# Patient Record
Sex: FEMALE | Race: OTHER | HISPANIC OR LATINO | URBAN - METROPOLITAN AREA
[De-identification: names, ages, dates, MRNs, and addresses within clinical notes are randomized per-mention and may not be internally consistent; named-entity substitution may affect disease eponyms.]

---

## 2019-10-22 ENCOUNTER — OUTPATIENT (OUTPATIENT)
Dept: OUTPATIENT SERVICES | Facility: HOSPITAL | Age: 19
LOS: 1 days | Discharge: HOME | End: 2019-10-22

## 2019-10-23 DIAGNOSIS — N89.8 OTHER SPECIFIED NONINFLAMMATORY DISORDERS OF VAGINA: ICD-10-CM

## 2019-11-14 ENCOUNTER — INPATIENT (INPATIENT)
Facility: HOSPITAL | Age: 19
LOS: 2 days | Discharge: HOME | End: 2019-11-17
Attending: OBSTETRICS & GYNECOLOGY | Admitting: OBSTETRICS & GYNECOLOGY

## 2019-11-14 VITALS
RESPIRATION RATE: 16 BRPM | TEMPERATURE: 98 F | SYSTOLIC BLOOD PRESSURE: 130 MMHG | HEART RATE: 90 BPM | DIASTOLIC BLOOD PRESSURE: 90 MMHG

## 2019-11-14 LAB
ALBUMIN SERPL ELPH-MCNC: 3.8 G/DL — SIGNIFICANT CHANGE UP (ref 3.5–5.2)
ALP SERPL-CCNC: 176 U/L — HIGH (ref 30–115)
ALT FLD-CCNC: 8 U/L — SIGNIFICANT CHANGE UP (ref 0–41)
AMPHET UR-MCNC: NEGATIVE — SIGNIFICANT CHANGE UP
ANION GAP SERPL CALC-SCNC: 16 MMOL/L — HIGH (ref 7–14)
APPEARANCE UR: ABNORMAL
AST SERPL-CCNC: 14 U/L — SIGNIFICANT CHANGE UP (ref 0–41)
BACTERIA # UR AUTO: ABNORMAL
BARBITURATES UR SCN-MCNC: NEGATIVE — SIGNIFICANT CHANGE UP
BASOPHILS # BLD AUTO: 0.03 K/UL — SIGNIFICANT CHANGE UP (ref 0–0.2)
BASOPHILS NFR BLD AUTO: 0.3 % — SIGNIFICANT CHANGE UP (ref 0–1)
BENZODIAZ UR-MCNC: NEGATIVE — SIGNIFICANT CHANGE UP
BILIRUB SERPL-MCNC: <0.2 MG/DL — SIGNIFICANT CHANGE UP (ref 0.2–1.2)
BILIRUB UR-MCNC: NEGATIVE — SIGNIFICANT CHANGE UP
BLD GP AB SCN SERPL QL: SIGNIFICANT CHANGE UP
BUN SERPL-MCNC: 9 MG/DL — LOW (ref 10–20)
BUPRENORPHINE SCREEN, URINE RESULT: NEGATIVE — SIGNIFICANT CHANGE UP
CALCIUM SERPL-MCNC: 9.3 MG/DL — SIGNIFICANT CHANGE UP (ref 8.5–10.1)
CHLORIDE SERPL-SCNC: 100 MMOL/L — SIGNIFICANT CHANGE UP (ref 98–110)
CO2 SERPL-SCNC: 20 MMOL/L — SIGNIFICANT CHANGE UP (ref 17–32)
COCAINE METAB.OTHER UR-MCNC: NEGATIVE — SIGNIFICANT CHANGE UP
COLOR SPEC: SIGNIFICANT CHANGE UP
CREAT ?TM UR-MCNC: 66 MG/DL — SIGNIFICANT CHANGE UP
CREAT SERPL-MCNC: 0.7 MG/DL — SIGNIFICANT CHANGE UP (ref 0.7–1.5)
DIFF PNL FLD: ABNORMAL
EOSINOPHIL # BLD AUTO: 0.05 K/UL — SIGNIFICANT CHANGE UP (ref 0–0.7)
EOSINOPHIL NFR BLD AUTO: 0.4 % — SIGNIFICANT CHANGE UP (ref 0–8)
EPI CELLS # UR: 10 /HPF — HIGH (ref 0–5)
FENTANYL UR QL: NEGATIVE — SIGNIFICANT CHANGE UP
GLUCOSE SERPL-MCNC: 104 MG/DL — HIGH (ref 70–99)
GLUCOSE UR QL: NEGATIVE — SIGNIFICANT CHANGE UP
HCT VFR BLD CALC: 31.7 % — LOW (ref 37–47)
HGB BLD-MCNC: 10.9 G/DL — LOW (ref 12–16)
HYALINE CASTS # UR AUTO: 21 /LPF — HIGH (ref 0–7)
IMM GRANULOCYTES NFR BLD AUTO: 0.9 % — HIGH (ref 0.1–0.3)
KETONES UR-MCNC: NEGATIVE — SIGNIFICANT CHANGE UP
L&D DRUG SCREEN, URINE: SIGNIFICANT CHANGE UP
LEUKOCYTE ESTERASE UR-ACNC: ABNORMAL
LYMPHOCYTES # BLD AUTO: 1.64 K/UL — SIGNIFICANT CHANGE UP (ref 1.2–3.4)
LYMPHOCYTES # BLD AUTO: 14.1 % — LOW (ref 20.5–51.1)
MCHC RBC-ENTMCNC: 29.2 PG — SIGNIFICANT CHANGE UP (ref 27–31)
MCHC RBC-ENTMCNC: 34.4 G/DL — SIGNIFICANT CHANGE UP (ref 32–37)
MCV RBC AUTO: 85 FL — SIGNIFICANT CHANGE UP (ref 81–99)
METHADONE UR-MCNC: NEGATIVE — SIGNIFICANT CHANGE UP
MONOCYTES # BLD AUTO: 0.9 K/UL — HIGH (ref 0.1–0.6)
MONOCYTES NFR BLD AUTO: 7.7 % — SIGNIFICANT CHANGE UP (ref 1.7–9.3)
NEUTROPHILS # BLD AUTO: 8.92 K/UL — HIGH (ref 1.4–6.5)
NEUTROPHILS NFR BLD AUTO: 76.6 % — HIGH (ref 42.2–75.2)
NITRITE UR-MCNC: NEGATIVE — SIGNIFICANT CHANGE UP
NRBC # BLD: 0 /100 WBCS — SIGNIFICANT CHANGE UP (ref 0–0)
OPIATES UR-MCNC: NEGATIVE — SIGNIFICANT CHANGE UP
OXYCODONE UR-MCNC: NEGATIVE — SIGNIFICANT CHANGE UP
PCP UR-MCNC: NEGATIVE — SIGNIFICANT CHANGE UP
PH UR: 7 — SIGNIFICANT CHANGE UP (ref 5–8)
PLATELET # BLD AUTO: 195 K/UL — SIGNIFICANT CHANGE UP (ref 130–400)
POTASSIUM SERPL-MCNC: 4 MMOL/L — SIGNIFICANT CHANGE UP (ref 3.5–5)
POTASSIUM SERPL-SCNC: 4 MMOL/L — SIGNIFICANT CHANGE UP (ref 3.5–5)
PRENATAL SYPHILIS TEST: SIGNIFICANT CHANGE UP
PROPOXYPHENE QUALITATIVE URINE RESULT: NEGATIVE — SIGNIFICANT CHANGE UP
PROT ?TM UR-MCNC: 20 MG/DLG/24H — SIGNIFICANT CHANGE UP
PROT SERPL-MCNC: 6.8 G/DL — SIGNIFICANT CHANGE UP (ref 6–8)
PROT UR-MCNC: NEGATIVE — SIGNIFICANT CHANGE UP
PROT/CREAT UR-RTO: 0.3 RATIO — HIGH (ref 0–0.2)
RBC # BLD: 3.73 M/UL — LOW (ref 4.2–5.4)
RBC # FLD: 13.3 % — SIGNIFICANT CHANGE UP (ref 11.5–14.5)
RBC CASTS # UR COMP ASSIST: 1 /HPF — SIGNIFICANT CHANGE UP (ref 0–4)
SODIUM SERPL-SCNC: 136 MMOL/L — SIGNIFICANT CHANGE UP (ref 135–146)
SP GR SPEC: 1.01 — SIGNIFICANT CHANGE UP (ref 1.01–1.02)
URATE SERPL-MCNC: 5.2 MG/DL — SIGNIFICANT CHANGE UP (ref 2.5–7)
UROBILINOGEN FLD QL: SIGNIFICANT CHANGE UP
WBC # BLD: 11.64 K/UL — HIGH (ref 4.8–10.8)
WBC # FLD AUTO: 11.64 K/UL — HIGH (ref 4.8–10.8)
WBC UR QL: 92 /HPF — HIGH (ref 0–5)

## 2019-11-14 RX ORDER — OXYTOCIN 10 UNIT/ML
333.33 VIAL (ML) INJECTION
Qty: 20 | Refills: 0 | Status: COMPLETED | OUTPATIENT
Start: 2019-11-14 | End: 2019-11-14

## 2019-11-14 RX ORDER — DEXAMETHASONE 0.5 MG/5ML
4 ELIXIR ORAL EVERY 6 HOURS
Refills: 0 | Status: DISCONTINUED | OUTPATIENT
Start: 2019-11-14 | End: 2019-11-17

## 2019-11-14 RX ORDER — FENTANYL/BUPIVACAINE/NS/PF 2MCG/ML-.1
250 PLASTIC BAG, INJECTION (ML) INJECTION
Refills: 0 | Status: DISCONTINUED | OUTPATIENT
Start: 2019-11-14 | End: 2019-11-17

## 2019-11-14 RX ORDER — ONDANSETRON 8 MG/1
4 TABLET, FILM COATED ORAL EVERY 6 HOURS
Refills: 0 | Status: DISCONTINUED | OUTPATIENT
Start: 2019-11-14 | End: 2019-11-17

## 2019-11-14 RX ORDER — NALOXONE HYDROCHLORIDE 4 MG/.1ML
0.1 SPRAY NASAL
Refills: 0 | Status: DISCONTINUED | OUTPATIENT
Start: 2019-11-14 | End: 2019-11-17

## 2019-11-14 RX ORDER — OXYTOCIN 10 UNIT/ML
2 VIAL (ML) INJECTION
Qty: 30 | Refills: 0 | Status: DISCONTINUED | OUTPATIENT
Start: 2019-11-14 | End: 2019-11-17

## 2019-11-14 RX ORDER — SODIUM CHLORIDE 9 MG/ML
1000 INJECTION, SOLUTION INTRAVENOUS
Refills: 0 | Status: DISCONTINUED | OUTPATIENT
Start: 2019-11-14 | End: 2019-11-15

## 2019-11-14 RX ADMIN — Medication 2 MILLIUNIT(S)/MIN: at 13:11

## 2019-11-14 NOTE — PROGRESS NOTE ADULT - ASSESSMENT
A/P: 20yo  at 39w, GBS neg, preeclampsia without severe features, BPs now well controlled, s/p epidural, currently on pitocin  -continue pitocin  -pain management prn  -cont efm/toco  -cont to monitor vitals  -cont iv hydration  -f/u pending labs       Dr. Kim aware and Dr. Gibbons to be made aware

## 2019-11-14 NOTE — OB PROVIDER H&P - NSHPLABSRESULTS_GEN_ALL_CORE
NIPT low risk        Sono @ 29.6wks S=D, EFW: 1597g, 3'8" (59%), vtx, ant placenta, BPP 8/8  Sono @ 18.4wks S=D, Nl anatomy  Sono @ 12.6wks S=D, NT WNL    Seq 1/ 2 negative

## 2019-11-14 NOTE — OB PROVIDER H&P - ASSESSMENT
19y.o.  @ 39wks in early labor, elevated BP r/o gHTN vs preesclampsia  Admit to L&D  IVF, labs  Continuous efm and toco  Pain management PRN  Anticipate   Dr. Gibbons Aware

## 2019-11-14 NOTE — OB PROVIDER H&P - HISTORY OF PRESENT ILLNESS
19y.o.  @ 39wks by LMP presents c/o ctx since last night which became more intense at 0400am. Pt denies LOF, VB and states good FM.  Pt found to have elevated BP in triage. Pt states she had HA last night, left frontal which resolved spontaneously. Pt denies visual changes or epigastric pain.

## 2019-11-14 NOTE — PROGRESS NOTE ADULT - SUBJECTIVE AND OBJECTIVE BOX
PGY1 Note    Patient seen at bedside for evaluation of labor progression, doing well, no complaints.     T(F): 98.2 (19 @ 08:37), Max: 98.2 (19 @ 08:01)  HR: 68 (19 @ 18:48) (67 - 114)  BP: 92/59 (19 @ 18:47) (92/59 - 145/98)  RR: 16 (19 @ 08:37) (16 - 16)  SpO2: 99% (19 @ 18:48) (94% - 100%)      EFM: 130/mod/+accels; cat 1  TOCO: q2-3 mins  SVE: deferred, 3.5/80/-2 @1200    Medications:  (Floorstock): 1 Each (19 @ 12:39)  (Floorstock): 1 Each (19 @ 12:39)  (Floorstock): 1 Each (19 @ 17:50)  oxytocin Infusion: 2 mL/Hr (19 @ 12:11)      Labs:                        10.9   11.64 )-----------( 195      ( 2019 08:32 )             31.7         136  |  100  |  9<L>  ----------------------------<  104<H>  4.0   |  20  |  0.7    Ca    9.3      2019 08:32    TPro  6.8  /  Alb  3.8  /  TBili  <0.2  /  DBili  x   /  AST  14  /  ALT  8   /  AlkPhos  176<H>      ABO RH Interpretation: B POS (19 @ 09:27)    Antibody Screen: NEG (19 @ 09:27)    Urinalysis Basic - ( 2019 08:32 )    Color: Light Yellow / Appearance: Slightly Turbid / S.012 / pH: x  Gluc: x / Ketone: Negative  / Bili: Negative / Urobili: <2 mg/dL   Blood: x / Protein: Negative / Nitrite: Negative   Leuk Esterase: Large / RBC: 1 /HPF / WBC 92 /HPF   Sq Epi: x / Non Sq Epi: 10 /HPF / Bacteria: Moderate      L&amp;D Drug Screen, Urine: Done (19 @ 08:32)    Prenatal Syphilis Test: Nonreact (19 @ 08:32)    Uric Acid, Serum: 5.2 mg/dL (19 @ 08:32)      Protein/Creatinine Ratio Calculation: 0.3 Ratio (19 @ 08:32)

## 2019-11-14 NOTE — PROGRESS NOTE ADULT - SUBJECTIVE AND OBJECTIVE BOX
PGY 1 Note    Patient seen at bedside for evaluation of labor progression.  Reports some contractions but does not desire pain management at this time.  Denies headache, changes in vision, chest pain, SOB, RUQ/epigastric pain, N/V, fevers, chills, diarrhea, LE pain/swelling.    Vital Signs Last 24 Hrs  T(C): 36.8 (2019 08:37), Max: 36.8 (2019 08:01)  T(F): 98.2 (2019 08:37), Max: 98.2 (2019 08:01)  HR: 86 (2019 12:07) (67 - 110)  BP: 134/86 (2019 12:07) (120/97 - 145/89) first elevated 130/90 @0804  RR: 16 (2019 08:37) (16 - 16)    EFM: 120/mod/+accel, Cat I  TOCO: q6-8m  SVE: 3.5/80/-2 @1200 per Dr. Gibbons    Labs:                        10.9   11.64 )-----------( 195      ( 2019 08:32 )             31.7         136  |  100  |  9<L>  ----------------------------<  104<H>  4.0   |  20  |  0.7    Ca    9.3      2019 08:32    TPro  6.8  /  Alb  3.8  /  TBili  <0.2  /  DBili  x   /  AST  14  /  ALT  8   /  AlkPhos  176<H>      ABO RH Interpretation: B POS (19 @ 09:27)  antibody neg  Uric acid 5.2    Urinalysis Basic - ( 2019 08:32 )    Color: Light Yellow / Appearance: Slightly Turbid / S.012 / pH: x  Gluc: x / Ketone: Negative  / Bili: Negative / Urobili: <2 mg/dL   Blood: x / Protein: Negative / Nitrite: Negative   Leuk Esterase: Large / RBC: 1 /HPF / WBC 92 /HPF   Sq Epi: x / Non Sq Epi: 10 /HPF / Bacteria: Moderate          Meds: lactated ringers. 1000 milliLiter(s) IV Continuous <Continuous>  oxytocin Infusion 333.333 milliUNIT(s)/Min IV Continuous <Continuous>  oxytocin Infusion 2 milliUNIT(s)/Min IV Continuous <Continuous>

## 2019-11-14 NOTE — PROGRESS NOTE ADULT - ASSESSMENT
A/P:  20yo  @39w, GBS neg, r/o gHTN vs. preeclampsia, BPs now well controlled, in early labor  -cont EFM/toco  -clear liquid diet, IVF  -pain management prn  -f/u UDS, urine protein creatinine  -BPs q15m  -start pitocin at 1300 for labor augmentation    Dr. Erickson and Dr. Gibbons aware.

## 2019-11-14 NOTE — OB RN PATIENT PROFILE - PRO ANTIBODY SCREEN
Electrical Cardioversion: What to Expect at Home  Your Recovery    Electrical cardioversion is a treatment for an abnormal heartbeat, such as atrial fibrillation, supraventricular tachycardia, or ventricular tachycardia (VT). It uses a brief electrical shock to reset your heart's rhythm. After cardioversion, you may have redness, like a sunburn, where the patches were. The medicines you got to make you sleepy may make you feel drowsy for the rest of the day. Your doctor may have you take medicines to help the heart beat normally and to prevent blood clots. This care sheet gives you a general idea about how long it will take for you to recover. But each person recovers at a different pace. Follow the steps below to feel better as quickly as possible. How can you care for yourself at home? Medicines  · Be safe with medicines. Take your medicines exactly as prescribed. Call your doctor if you think you are having a problem with your medicine. You may take one or more of the following medicines:  ¨ Rate-control medicines to slow the heart rate. These include beta-blockers, calcium channel blockers, and digoxin. ¨ Rhythm control medicines that help the heart keep a normal rhythm. ¨ Blood thinners, also called anticoagulants, which help prevent blood clots. You will get more details on the specific medicines your doctor prescribes. Be sure you know how to take your medicines safely. · Do not take any vitamins, over-the-counter medicines, or herbal products without talking to your doctor first.  Exercise  · Start light exercise if your doctor says that it is okay. Even a small amount will help you get stronger, have more energy, and manage your stress. Walking is an easy way to get exercise. Start out by walking a little more than you did in the hospital. Bit by bit, increase the amount you walk. · When you exercise, watch for signs that your heart is working too hard.  You are pushing too hard if you cannot talk while you are exercising. If you become short of breath or dizzy or have chest pain, sit down and rest right away. · Check your pulse regularly. Place two fingers on the artery at the palm side of your wrist in line with your thumb. If your heartbeat seems uneven or fast, talk to your doctor. Other instructions  · Ask your doctor when you can drive again. · Do not smoke. If you need help quitting, talk to your doctor about stop-smoking programs and medicines. These can increase your chances of quitting for good. · Limit alcohol. Follow-up care is a key part of your treatment and safety. Be sure to make and go to all appointments, and call your doctor if you are having problems. It's also a good idea to know your test results and keep a list of the medicines you take. When should you call for help? Call 911 anytime you think you may need emergency care. For example, call if:  · You have chest pain or pressure. This may occur with:  ¨ Sweating. ¨ Shortness of breath. ¨ Nausea or vomiting. ¨ Pain that spreads from the chest to the neck, jaw, or one or both shoulders or arms. ¨ A fast or uneven pulse. After calling 911, the  may tell you to chew 1 adult-strength or 2 to 4 low-dose aspirin. Wait for an ambulance. Do not try to drive yourself. · You have symptoms of a stroke. These may include:  ¨ Sudden numbness, tingling, weakness, or loss of movement in your face, arm, or leg, especially on only one side of your body. ¨ Sudden vision changes. ¨ Sudden trouble speaking. ¨ Sudden confusion or trouble understanding simple statements. ¨ Sudden problems with walking or balance. ¨ A sudden, severe headache that is different from past headaches. · You vomit blood or what looks like coffee grounds. · You pass maroon or very bloody stools. · You passed out (lost consciousness).   Call your doctor now or seek immediate medical care if:  · You feel dizzy or lightheaded, or you feel like you may faint.  · Your heart rate becomes irregular. · You have shortness of breath. · You have any unusual bleeding, such as:  ¨ Bruises or blood spots under the skin. ¨ A nosebleed that you cannot stop. ¨ Bleeding gums when you brush your teeth. ¨ Blood in your urine. ¨ Vaginal bleeding when you are not having your period, or heavy period bleeding. ¨ Your stools are black and tarlike or have streaks of blood. Watch closely for any changes in your health, and be sure to contact your doctor if:  · You do not get better as expected. Where can you learn more? Go to http://buck-anjelica.info/. Enter A617 in the search box to learn more about \"Electrical Cardioversion: What to Expect at Home. \"  Current as of: January 27, 2016  Content Version: 11.1  © 7065-8067 [employer]. Care instructions adapted under license by [employer] (which disclaims liability or warranty for this information). If you have questions about a medical condition or this instruction, always ask your healthcare professional. [place] [address] any warranty or liability for your use of this information. negative

## 2019-11-14 NOTE — PROGRESS NOTE ADULT - SUBJECTIVE AND OBJECTIVE BOX
PGY1 Note    Patient seen at bedside. No complaints at the moment.    T(F): 98.2 ( @ 08:37), Max: 98.2 ( @ 08:01)  HR: 86 ( @ 21:43)  BP: 111/72 ( @ 21:33) (92/59 - 145/98)  RR: 16 ( @ 08:37)    EFM: 130bpm/moderate variability/+accels  TOCO: q2-3mins   SVE: 7/90/-2, vtx, AROM clear     Medications:  oxytocin Infusion: 2 ( @ 12:11)      Labs:                        10.9   11.64 )-----------( 195      ( 2019 08:32 )             31.7         136  |  100  |  9<L>  ----------------------------<  104<H>  4.0   |  20  |  0.7    Ca    9.3      2019 08:32    TPro  6.8  /  Alb  3.8  /  TBili  <0.2  /  DBili  x   /  AST  14  /  ALT  8   /  AlkPhos  176<H>      Prenatal Syphilis Test: Nonreact ( @ 08:32)  Uric Acid, Serum: 5.2 mg/dL ( @ 08:32)  Antibody Screen: NEG (19 @ 09:27)    Urinalysis Basic - ( 2019 08:32 )    Color: Light Yellow / Appearance: Slightly Turbid / S.012 / pH: x  Gluc: x / Ketone: Negative  / Bili: Negative / Urobili: <2 mg/dL   Blood: x / Protein: Negative / Nitrite: Negative   Leuk Esterase: Large / RBC: 1 /HPF / WBC 92 /HPF   Sq Epi: x / Non Sq Epi: 10 /HPF / Bacteria: Moderate

## 2019-11-14 NOTE — OB PROVIDER H&P - NSHPPHYSICALEXAM_GEN_ALL_CORE
T(C): 36.8 (11-14-19 @ 08:01), Max: 36.8 (11-14-19 @ 08:01)  HR: 110 (11-14-19 @ 08:20) (90 - 110)  BP: 128/93 (11-14-19 @ 08:20) (128/93 - 130/90)  RR: 16 (11-14-19 @ 08:01) (16 - 16)    Abd: gravid, soft, NT, neg epigastric/ RUQ tenderness  VE: 3-4/80/-2  Ctx: q 2-5 min  FHR: 140 moderate variability, Cat I

## 2019-11-14 NOTE — OB PROVIDER H&P - ATTENDING COMMENTS
IMP: IUP @ 39wks, Early Labor, elevated BP    Plan: Admit, Labs, Possible pitocin augmentation, pain management, anticipated

## 2019-11-14 NOTE — PROGRESS NOTE ADULT - ASSESSMENT
A/P: 18yo  @39w, GBS neg, preeclampsia without severe features, BPs now well controlled, s/p epidural, currently on pitocin  -continue pitocin  -pain management prn  -cont efm/toco  -cont to monitor vitals  -cont iv hydration    Will make Dr. Erickson and Dr. Gibbons aware.

## 2019-11-15 RX ORDER — GLYCERIN ADULT
1 SUPPOSITORY, RECTAL RECTAL AT BEDTIME
Refills: 0 | Status: DISCONTINUED | OUTPATIENT
Start: 2019-11-15 | End: 2019-11-17

## 2019-11-15 RX ORDER — LANOLIN
1 OINTMENT (GRAM) TOPICAL EVERY 6 HOURS
Refills: 0 | Status: DISCONTINUED | OUTPATIENT
Start: 2019-11-15 | End: 2019-11-17

## 2019-11-15 RX ORDER — IBUPROFEN 200 MG
600 TABLET ORAL EVERY 6 HOURS
Refills: 0 | Status: COMPLETED | OUTPATIENT
Start: 2019-11-15 | End: 2020-10-13

## 2019-11-15 RX ORDER — SIMETHICONE 80 MG/1
80 TABLET, CHEWABLE ORAL EVERY 4 HOURS
Refills: 0 | Status: DISCONTINUED | OUTPATIENT
Start: 2019-11-15 | End: 2019-11-17

## 2019-11-15 RX ORDER — MAGNESIUM HYDROXIDE 400 MG/1
30 TABLET, CHEWABLE ORAL
Refills: 0 | Status: DISCONTINUED | OUTPATIENT
Start: 2019-11-15 | End: 2019-11-17

## 2019-11-15 RX ORDER — HYDROCORTISONE 1 %
1 OINTMENT (GRAM) TOPICAL EVERY 6 HOURS
Refills: 0 | Status: DISCONTINUED | OUTPATIENT
Start: 2019-11-15 | End: 2019-11-17

## 2019-11-15 RX ORDER — DIPHENHYDRAMINE HCL 50 MG
25 CAPSULE ORAL EVERY 6 HOURS
Refills: 0 | Status: DISCONTINUED | OUTPATIENT
Start: 2019-11-15 | End: 2019-11-17

## 2019-11-15 RX ORDER — BENZOCAINE 10 %
1 GEL (GRAM) MUCOUS MEMBRANE EVERY 6 HOURS
Refills: 0 | Status: DISCONTINUED | OUTPATIENT
Start: 2019-11-15 | End: 2019-11-17

## 2019-11-15 RX ORDER — DIBUCAINE 1 %
1 OINTMENT (GRAM) RECTAL EVERY 6 HOURS
Refills: 0 | Status: DISCONTINUED | OUTPATIENT
Start: 2019-11-15 | End: 2019-11-17

## 2019-11-15 RX ORDER — KETOROLAC TROMETHAMINE 30 MG/ML
30 SYRINGE (ML) INJECTION ONCE
Refills: 0 | Status: DISCONTINUED | OUTPATIENT
Start: 2019-11-15 | End: 2019-11-15

## 2019-11-15 RX ORDER — OXYCODONE HYDROCHLORIDE 5 MG/1
5 TABLET ORAL
Refills: 0 | Status: DISCONTINUED | OUTPATIENT
Start: 2019-11-15 | End: 2019-11-17

## 2019-11-15 RX ORDER — INFLUENZA VIRUS VACCINE 15; 15; 15; 15 UG/.5ML; UG/.5ML; UG/.5ML; UG/.5ML
0.5 SUSPENSION INTRAMUSCULAR ONCE
Refills: 0 | Status: COMPLETED | OUTPATIENT
Start: 2019-11-15 | End: 2019-11-16

## 2019-11-15 RX ORDER — AER TRAVELER 0.5 G/1
1 SOLUTION RECTAL; TOPICAL EVERY 4 HOURS
Refills: 0 | Status: DISCONTINUED | OUTPATIENT
Start: 2019-11-15 | End: 2019-11-17

## 2019-11-15 RX ORDER — OXYCODONE HYDROCHLORIDE 5 MG/1
5 TABLET ORAL ONCE
Refills: 0 | Status: DISCONTINUED | OUTPATIENT
Start: 2019-11-15 | End: 2019-11-17

## 2019-11-15 RX ORDER — ACETAMINOPHEN 500 MG
975 TABLET ORAL
Refills: 0 | Status: DISCONTINUED | OUTPATIENT
Start: 2019-11-15 | End: 2019-11-17

## 2019-11-15 RX ORDER — PRAMOXINE HYDROCHLORIDE 150 MG/15G
1 AEROSOL, FOAM RECTAL EVERY 4 HOURS
Refills: 0 | Status: DISCONTINUED | OUTPATIENT
Start: 2019-11-15 | End: 2019-11-17

## 2019-11-15 RX ORDER — IBUPROFEN 200 MG
600 TABLET ORAL EVERY 6 HOURS
Refills: 0 | Status: DISCONTINUED | OUTPATIENT
Start: 2019-11-15 | End: 2019-11-17

## 2019-11-15 RX ORDER — SODIUM CHLORIDE 9 MG/ML
3 INJECTION INTRAMUSCULAR; INTRAVENOUS; SUBCUTANEOUS EVERY 8 HOURS
Refills: 0 | Status: DISCONTINUED | OUTPATIENT
Start: 2019-11-15 | End: 2019-11-17

## 2019-11-15 RX ADMIN — Medication 30 MILLIGRAM(S): at 05:10

## 2019-11-15 RX ADMIN — Medication 975 MILLIGRAM(S): at 08:31

## 2019-11-15 RX ADMIN — Medication 975 MILLIGRAM(S): at 14:57

## 2019-11-15 RX ADMIN — SODIUM CHLORIDE 3 MILLILITER(S): 9 INJECTION INTRAMUSCULAR; INTRAVENOUS; SUBCUTANEOUS at 20:40

## 2019-11-15 RX ADMIN — Medication 600 MILLIGRAM(S): at 17:16

## 2019-11-15 RX ADMIN — SODIUM CHLORIDE 3 MILLILITER(S): 9 INJECTION INTRAMUSCULAR; INTRAVENOUS; SUBCUTANEOUS at 14:56

## 2019-11-15 RX ADMIN — Medication 600 MILLIGRAM(S): at 23:09

## 2019-11-15 RX ADMIN — Medication 600 MILLIGRAM(S): at 11:56

## 2019-11-15 RX ADMIN — Medication 975 MILLIGRAM(S): at 20:40

## 2019-11-15 NOTE — OB PROVIDER DELIVERY SUMMARY - NSPROVIDERDELIVERYNOTE_OBGYN_ALL_OB_FT
Pt became fully dilated and pushed well over intact perineum, delivery of head in MELINDA position, nose and mouth bulb suctioned on perineum, followed by delivery of shoulders and body without difficulty, live male infant, APGARs 9/9, 3-vessel cord + placenta complete, 2nd degree perineal and b/l periurethral lacerations repaired with chromic, no complications

## 2019-11-15 NOTE — PROGRESS NOTE ADULT - SUBJECTIVE AND OBJECTIVE BOX
PGY1 Note    Patient seen at bedside. Pt complaining of increased consistent vaginal and rectal pressure.     T(F): 99.14 (11-15 @ 00:11), Max: 99.14 (11-15 @ 00:11)  HR: 111 (11-15 @ 00:23)  BP: 127/85 (11-15 @ 00:20) (82/58 - 145/98)  RR: 16 (11-15 @ 00:11)    EFM: 140bpm/moderate variability/+accels/+decels  TOCO: q2-4mins   SVE: deferred    Medications:  oxytocin Infusion: 2 ( @ 12:11)      Labs:                        10.9   11.64 )-----------( 195      ( 2019 08:32 )             31.7         136  |  100  |  9<L>  ----------------------------<  104<H>  4.0   |  20  |  0.7    Ca    9.3      2019 08:32    TPro  6.8  /  Alb  3.8  /  TBili  <0.2  /  DBili  x   /  AST  14  /  ALT  8   /  AlkPhos  176<H>      Prenatal Syphilis Test: Nonreact ( @ 08:32)  Uric Acid, Serum: 5.2 mg/dL ( @ 08:32)  Antibody Screen: NEG (19 @ 09:27)    Urinalysis Basic - ( 2019 08:32 )    Color: Light Yellow / Appearance: Slightly Turbid / S.012 / pH: x  Gluc: x / Ketone: Negative  / Bili: Negative / Urobili: <2 mg/dL   Blood: x / Protein: Negative / Nitrite: Negative   Leuk Esterase: Large / RBC: 1 /HPF / WBC 92 /HPF   Sq Epi: x / Non Sq Epi: 10 /HPF / Bacteria: Moderate

## 2019-11-15 NOTE — OB PROVIDER DELIVERY SUMMARY - NSPPHNORISK_OBGYN_ALL_OB
Problem: Respiratory Insufficiency (Adult)  Goal: Identify Related Risk Factors and Signs and Symptoms  Outcome: Outcome(s) achieved Date Met:  04/25/17       In my judgment no risk for PPH has been identified at this time.

## 2019-11-15 NOTE — PROGRESS NOTE ADULT - ASSESSMENT
A/P: 20yo  at 39w1d GA, GBS neg, preeclampsia without severe features, BPs now well controlled, s/p epidural, currently on pitocin  -continue pitocin  -pain management prn  -cont efm/toco  -cont to monitor vitals  -cont iv hydration  -f/u pending labs       Dr. Kim aware and Dr. Gibbons to be made aware

## 2019-11-16 LAB
HCT VFR BLD CALC: 26.7 % — LOW (ref 37–47)
HGB BLD-MCNC: 8.8 G/DL — LOW (ref 12–16)
MCHC RBC-ENTMCNC: 28.4 PG — SIGNIFICANT CHANGE UP (ref 27–31)
MCHC RBC-ENTMCNC: 33 G/DL — SIGNIFICANT CHANGE UP (ref 32–37)
MCV RBC AUTO: 86.1 FL — SIGNIFICANT CHANGE UP (ref 81–99)
NRBC # BLD: 0 /100 WBCS — SIGNIFICANT CHANGE UP (ref 0–0)
PLATELET # BLD AUTO: 179 K/UL — SIGNIFICANT CHANGE UP (ref 130–400)
RBC # BLD: 3.1 M/UL — LOW (ref 4.2–5.4)
RBC # FLD: 13.8 % — SIGNIFICANT CHANGE UP (ref 11.5–14.5)
WBC # BLD: 15.57 K/UL — HIGH (ref 4.8–10.8)
WBC # FLD AUTO: 15.57 K/UL — HIGH (ref 4.8–10.8)

## 2019-11-16 RX ADMIN — Medication 975 MILLIGRAM(S): at 21:27

## 2019-11-16 RX ADMIN — Medication 600 MILLIGRAM(S): at 11:51

## 2019-11-16 RX ADMIN — Medication 975 MILLIGRAM(S): at 14:51

## 2019-11-16 RX ADMIN — Medication 600 MILLIGRAM(S): at 12:21

## 2019-11-16 RX ADMIN — INFLUENZA VIRUS VACCINE 0.5 MILLILITER(S): 15; 15; 15; 15 SUSPENSION INTRAMUSCULAR at 11:45

## 2019-11-16 RX ADMIN — Medication 600 MILLIGRAM(S): at 05:01

## 2019-11-16 RX ADMIN — Medication 975 MILLIGRAM(S): at 03:28

## 2019-11-16 RX ADMIN — Medication 975 MILLIGRAM(S): at 08:56

## 2019-11-16 RX ADMIN — Medication 600 MILLIGRAM(S): at 18:18

## 2019-11-16 RX ADMIN — SODIUM CHLORIDE 3 MILLILITER(S): 9 INJECTION INTRAMUSCULAR; INTRAVENOUS; SUBCUTANEOUS at 04:55

## 2019-11-16 NOTE — PROGRESS NOTE ADULT - ASSESSMENT
IMP: s/p  - PPD # 1 - Doing Well    Plan: Continued PP Care, NSAID's/PNV's, anticipated dc  IMP: s/p , Anemia - PPD # 1 - Doing Well    Plan: Continued PP Care, NSAID's/PNV's, anticipated dc

## 2019-11-16 NOTE — PROGRESS NOTE ADULT - SUBJECTIVE AND OBJECTIVE BOX
Pt seen at bedside, no complaints, nursing    Vital Signs Last 24 Hrs  T(C): 36.3 (16 Nov 2019 19:25), Max: 36.9 (16 Nov 2019 15:30)  T(F): 97.4 (16 Nov 2019 19:25), Max: 98.4 (16 Nov 2019 15:30)  HR: 100 (16 Nov 2019 19:25) (78 - 100)  BP: 134/78 (16 Nov 2019 19:25) (110/57 - 139/79)  RR: 18 (16 Nov 2019 19:25) (18 - 19)    Resp - CTA b/l  CVS - S1S2+, RRR  Breasts - soft, NT  Abd - soft, NTND, BS+, uterus - firm  VE - Moderate lochia, perineum- intact  Ext - No Homans                          8.8    15.57 )-----------( 179      ( 15 Nov 2019 23:00 )             26.7                         10.9   11.64 )-----------( 195      ( 14 Nov 2019 08:32 )             31.7     B POS, Rubella - Immune, Rubeola - Immune, RPR - NR

## 2019-11-17 VITALS
DIASTOLIC BLOOD PRESSURE: 71 MMHG | HEART RATE: 91 BPM | SYSTOLIC BLOOD PRESSURE: 127 MMHG | TEMPERATURE: 97 F | RESPIRATION RATE: 19 BRPM

## 2019-11-17 RX ORDER — ACETAMINOPHEN 500 MG
3 TABLET ORAL
Qty: 0 | Refills: 0 | DISCHARGE
Start: 2019-11-17

## 2019-11-17 RX ORDER — IBUPROFEN 200 MG
1 TABLET ORAL
Qty: 0 | Refills: 0 | DISCHARGE
Start: 2019-11-17

## 2019-11-17 RX ORDER — SIMETHICONE 80 MG/1
1 TABLET, CHEWABLE ORAL
Qty: 0 | Refills: 0 | DISCHARGE
Start: 2019-11-17

## 2019-11-17 RX ADMIN — Medication 600 MILLIGRAM(S): at 11:49

## 2019-11-17 RX ADMIN — Medication 975 MILLIGRAM(S): at 08:34

## 2019-11-17 RX ADMIN — Medication 975 MILLIGRAM(S): at 03:44

## 2019-11-17 RX ADMIN — Medication 600 MILLIGRAM(S): at 00:24

## 2019-11-17 RX ADMIN — Medication 600 MILLIGRAM(S): at 05:50

## 2019-11-17 NOTE — PROGRESS NOTE ADULT - SUBJECTIVE AND OBJECTIVE BOX
Pt seen at bedside, no complaints, nursing    Vital Signs Last 24 Hrs  T(C): 36.8 (17 Nov 2019 03:30), Max: 36.9 (16 Nov 2019 15:30)  T(F): 98.2 (17 Nov 2019 03:30), Max: 98.4 (16 Nov 2019 15:30)  HR: 83 (17 Nov 2019 03:30) (83 - 100)  BP: 118/60 (17 Nov 2019 03:30) (110/57 - 134/78)  RR: 18 (17 Nov 2019 03:30) (18 - 18)    Resp - CTA b/l  CVS - S1S2+, RRR  Breasts - soft, NT  Abd - soft, NTND, BS+, uterus - firm  VE - Minimal lochia, perineum- intact  Ext - No Homans

## 2019-11-17 NOTE — DISCHARGE NOTE OB - CARE PROVIDER_API CALL
Detail Level: Detailed Detail Level: Zone Ariel Gibbons)  Obstetrics and Gynecology  [address] [place] [address]  Phone: [phone]  Fax: [phone]  Follow Up Time:

## 2019-11-17 NOTE — DISCHARGE NOTE OB - CARE PLAN
Principal Discharge DX:	Vaginal delivery  Goal:	Healthy recovery  Assessment and plan of treatment:	Monitor vitals/labs, follow up in 6 weeks with provider  Secondary Diagnosis:	Preeclampsia  Goal:	Asymptomatic, BPs well controlled  Assessment and plan of treatment:	Monitor vitals/labs, BPs well controlled postpartum

## 2019-11-17 NOTE — DISCHARGE NOTE OB - HOSPITAL COURSE
11-17-19 @ 08:32    HPI:  19y.o.  @ 39wks by LMP presents c/o ctx since last night which became more intense at 0400am. Pt denies LOF, VB and states good FM.  Pt found to have elevated BP in triage. Pt states she had HA last night, left frontal which resolved spontaneously. Pt denies visual changes or epigastric pain. (2019 08:32)      PAST MEDICAL & SURGICAL HISTORY:  No pertinent past medical history  No significant past surgical history      POST PARTUM COURSE:  Uncomplicated postpartum course, to follow up with provider in 6 weeks for postpartum visit, home on iron.         LABS:                        8.8    15.57 )-----------( 179      ( 15 Nov 2019 23:00 )             26.7                10.9   11.64 )-----------( 195      ( 2019 08:32 )             31.7                 Allergies    No Known Allergies

## 2019-11-17 NOTE — DISCHARGE NOTE OB - PATIENT PORTAL LINK FT
You can access the FollowMyHealth Patient Portal offered by [facility] by registering at the following website: [email or web address]. By joining [employer]’s FollowMyHealth portal, you will also be able to view your health information using other applications (apps) compatible with our system.

## 2019-11-17 NOTE — DISCHARGE NOTE OB - PLAN OF CARE
Healthy recovery Monitor vitals/labs, follow up in 6 weeks with provider Asymptomatic, BPs well controlled Monitor vitals/labs, BPs well controlled postpartum

## 2019-11-21 DIAGNOSIS — Z3A.39 39 WEEKS GESTATION OF PREGNANCY: ICD-10-CM

## 2019-11-21 DIAGNOSIS — Z23 ENCOUNTER FOR IMMUNIZATION: ICD-10-CM

## 2021-08-03 NOTE — OB RN PATIENT PROFILE - CHOOSE INDICATION TO IMMUNIZE (AN ORDER WILL BE GENERATED WHEN THIS NOTE IS SAVED):
Medication: Oxycodone  Si tablet every 8 hrs as needed   Qty: 65  Dosage: 5-325mg  Last Refill:   Pharmacy: [facility]  Patient last seen:  Next appointment to be seen:     Patient is not pregnant (male or female)

## 2023-06-30 NOTE — OB PROVIDER H&P - NS_FINALEDD_OBGYN_ALL_OB_DT
21-Nov-2019 1) New malnutrition notification sent.   2) Continue current diet order: low sodium diet   3) RD to add Mighty Shake 3x/day (600 marjorie, 21 Gm protein) to help optimize PO intake.   4) RD will provide protein-fortified smoothie of day 1x/day (Monday-Friday, 300 marjorie 18 gm protein)   5) Monitor PO intake/tolerance, weights, labs, hydration status, bowels, and skin integrity.   6) Encourage adequate intake of meals and supplements to optimize PO intake.   7) Food preferences obtained, will honor as able.

## 2024-10-29 ENCOUNTER — WALK IN (OUTPATIENT)
Dept: URGENT CARE | Age: 24
End: 2024-10-29

## 2024-10-29 VITALS
WEIGHT: 158.38 LBS | OXYGEN SATURATION: 99 % | HEART RATE: 71 BPM | TEMPERATURE: 98.1 F | SYSTOLIC BLOOD PRESSURE: 127 MMHG | DIASTOLIC BLOOD PRESSURE: 79 MMHG | RESPIRATION RATE: 16 BRPM

## 2024-10-29 DIAGNOSIS — J02.9 VIRAL PHARYNGITIS: Primary | ICD-10-CM

## 2024-10-29 LAB
S PYO DNA THROAT QL NAA+PROBE: NOT DETECTED
TEST LOT EXPIRATION DATE: NORMAL
TEST LOT NUMBER: NORMAL

## 2024-10-29 PROCEDURE — 99203 OFFICE O/P NEW LOW 30 MIN: CPT | Performed by: PHYSICIAN ASSISTANT

## 2024-10-29 PROCEDURE — 87651 STREP A DNA AMP PROBE: CPT | Performed by: PHYSICIAN ASSISTANT

## 2024-10-29 ASSESSMENT — ENCOUNTER SYMPTOMS
ABDOMINAL PAIN: 0
COUGH: 0
VOICE CHANGE: 0
APPETITE CHANGE: 0
FEVER: 0
COLOR CHANGE: 0
TROUBLE SWALLOWING: 0
SINUS PAIN: 0
ABDOMINAL DISTENTION: 0
WHEEZING: 0
PSYCHIATRIC NEGATIVE: 1
HEMATOLOGIC/LYMPHATIC NEGATIVE: 1
BACK PAIN: 0
FACIAL SWELLING: 0
RHINORRHEA: 0
CHEST TIGHTNESS: 0
VOMITING: 0
SINUS PRESSURE: 0
SORE THROAT: 1
NEUROLOGICAL NEGATIVE: 1
CONSTITUTIONAL NEGATIVE: 1
DIARRHEA: 0
RESPIRATORY NEGATIVE: 1
FATIGUE: 0
NAUSEA: 0
GASTROINTESTINAL NEGATIVE: 1
CHILLS: 0
SHORTNESS OF BREATH: 0
EYES NEGATIVE: 1

## 2024-12-09 ENCOUNTER — WALK IN (OUTPATIENT)
Dept: URGENT CARE | Age: 24
End: 2024-12-09

## 2024-12-09 VITALS
SYSTOLIC BLOOD PRESSURE: 118 MMHG | TEMPERATURE: 98.2 F | DIASTOLIC BLOOD PRESSURE: 66 MMHG | RESPIRATION RATE: 17 BRPM | OXYGEN SATURATION: 100 % | HEART RATE: 74 BPM

## 2024-12-09 DIAGNOSIS — L03.211 FACIAL CELLULITIS: Primary | ICD-10-CM

## 2024-12-09 PROCEDURE — 99214 OFFICE O/P EST MOD 30 MIN: CPT | Performed by: PHYSICIAN ASSISTANT

## 2024-12-09 RX ORDER — DOXYCYCLINE 100 MG/1
100 CAPSULE ORAL 2 TIMES DAILY
Qty: 14 CAPSULE | Refills: 0 | Status: SHIPPED | OUTPATIENT
Start: 2024-12-09 | End: 2024-12-12 | Stop reason: ALTCHOICE

## 2024-12-09 ASSESSMENT — ENCOUNTER SYMPTOMS: WOUND: 1

## 2024-12-12 ENCOUNTER — OFFICE VISIT (OUTPATIENT)
Dept: INTERNAL MEDICINE | Age: 24
End: 2024-12-12

## 2024-12-12 VITALS
DIASTOLIC BLOOD PRESSURE: 60 MMHG | SYSTOLIC BLOOD PRESSURE: 104 MMHG | BODY MASS INDEX: 24.56 KG/M2 | OXYGEN SATURATION: 99 % | TEMPERATURE: 98.5 F | HEART RATE: 81 BPM | HEIGHT: 66 IN | WEIGHT: 152.8 LBS

## 2024-12-12 DIAGNOSIS — Z00.00 ANNUAL PHYSICAL EXAM: Primary | ICD-10-CM

## 2024-12-12 DIAGNOSIS — L03.211 CELLULITIS OF CHIN: ICD-10-CM

## 2024-12-12 RX ORDER — CEPHALEXIN 500 MG/1
500 CAPSULE ORAL 4 TIMES DAILY
Qty: 28 CAPSULE | Refills: 0 | Status: SHIPPED | OUTPATIENT
Start: 2024-12-12 | End: 2024-12-19

## 2024-12-26 ENCOUNTER — OFFICE VISIT (OUTPATIENT)
Dept: INTERNAL MEDICINE | Age: 24
End: 2024-12-26

## 2024-12-26 VITALS
WEIGHT: 152 LBS | HEART RATE: 77 BPM | HEIGHT: 66 IN | DIASTOLIC BLOOD PRESSURE: 60 MMHG | TEMPERATURE: 98.1 F | BODY MASS INDEX: 24.43 KG/M2 | SYSTOLIC BLOOD PRESSURE: 104 MMHG | OXYGEN SATURATION: 98 %

## 2024-12-26 DIAGNOSIS — J03.90 ACUTE TONSILLITIS, UNSPECIFIED ETIOLOGY: Primary | ICD-10-CM

## 2024-12-26 LAB
INTERNAL PROCEDURAL CONTROLS ACCEPTABLE: YES
S PYO AG THROAT QL IA.RAPID: NEGATIVE
TEST LOT EXPIRATION DATE: NORMAL
TEST LOT NUMBER: NORMAL

## 2024-12-26 PROCEDURE — 99213 OFFICE O/P EST LOW 20 MIN: CPT | Performed by: STUDENT IN AN ORGANIZED HEALTH CARE EDUCATION/TRAINING PROGRAM

## 2024-12-28 LAB — S PYO SPEC QL CULT: ABNORMAL

## 2024-12-30 ENCOUNTER — TELEPHONE (OUTPATIENT)
Dept: INTERNAL MEDICINE | Age: 24
End: 2024-12-30

## 2024-12-30 ENCOUNTER — TELEPHONE (OUTPATIENT)
Dept: FAMILY MEDICINE | Age: 24
End: 2024-12-30

## 2024-12-30 DIAGNOSIS — J03.00 ACUTE NON-RECURRENT STREPTOCOCCAL TONSILLITIS: Primary | ICD-10-CM

## 2024-12-30 RX ORDER — PENICILLIN V POTASSIUM 500 MG/1
500 TABLET, FILM COATED ORAL 3 TIMES DAILY
Qty: 30 TABLET | Refills: 0 | Status: SHIPPED | OUTPATIENT
Start: 2024-12-30 | End: 2025-01-09

## 2025-04-10 ENCOUNTER — LAB SERVICES (OUTPATIENT)
Dept: LAB | Age: 25
End: 2025-04-10

## 2025-04-10 ENCOUNTER — APPOINTMENT (OUTPATIENT)
Dept: INTERNAL MEDICINE | Age: 25
End: 2025-04-10

## 2025-04-10 VITALS
WEIGHT: 151.6 LBS | OXYGEN SATURATION: 99 % | HEART RATE: 82 BPM | BODY MASS INDEX: 24.36 KG/M2 | HEIGHT: 66 IN | SYSTOLIC BLOOD PRESSURE: 100 MMHG | DIASTOLIC BLOOD PRESSURE: 64 MMHG

## 2025-04-10 DIAGNOSIS — Z30.09 BIRTH CONTROL COUNSELING: ICD-10-CM

## 2025-04-10 DIAGNOSIS — Z13.228 SCREENING FOR ENDOCRINE, NUTRITIONAL, METABOLIC AND IMMUNITY DISORDER: ICD-10-CM

## 2025-04-10 DIAGNOSIS — Z13.0 SCREENING FOR ENDOCRINE, NUTRITIONAL, METABOLIC AND IMMUNITY DISORDER: ICD-10-CM

## 2025-04-10 DIAGNOSIS — Z11.3 SCREENING FOR STDS (SEXUALLY TRANSMITTED DISEASES): ICD-10-CM

## 2025-04-10 DIAGNOSIS — Z13.29 SCREENING FOR ENDOCRINE, NUTRITIONAL, METABOLIC AND IMMUNITY DISORDER: ICD-10-CM

## 2025-04-10 DIAGNOSIS — R20.0 NUMBNESS AND TINGLING OF RIGHT ARM: ICD-10-CM

## 2025-04-10 DIAGNOSIS — Z13.21 SCREENING FOR ENDOCRINE, NUTRITIONAL, METABOLIC AND IMMUNITY DISORDER: ICD-10-CM

## 2025-04-10 DIAGNOSIS — R20.2 NUMBNESS AND TINGLING OF RIGHT ARM: ICD-10-CM

## 2025-04-10 DIAGNOSIS — Z00.00 ANNUAL PHYSICAL EXAM: Primary | ICD-10-CM

## 2025-04-10 LAB
25(OH)D3+25(OH)D2 SERPL-MCNC: 20.6 NG/ML (ref 30–100)
ALBUMIN SERPL-MCNC: 3.8 G/DL (ref 3.4–5)
ALBUMIN/GLOB SERPL: 1.2 {RATIO} (ref 1–2.4)
ALP SERPL-CCNC: 55 UNITS/L (ref 45–117)
ALT SERPL-CCNC: 21 UNITS/L
ANION GAP SERPL CALC-SCNC: 8 MMOL/L (ref 7–19)
AST SERPL-CCNC: <8 UNITS/L
BASOPHILS # BLD: 0.1 K/MCL (ref 0–0.3)
BASOPHILS NFR BLD: 1 %
BILIRUB SERPL-MCNC: 0.5 MG/DL (ref 0.2–1)
BUN SERPL-MCNC: 9 MG/DL (ref 6–20)
BUN/CREAT SERPL: 12 (ref 7–25)
CALCIUM SERPL-MCNC: 9 MG/DL (ref 8.4–10.2)
CHLORIDE SERPL-SCNC: 106 MMOL/L (ref 97–110)
CHOLEST SERPL-MCNC: 124 MG/DL
CHOLEST/HDLC SERPL: 2 {RATIO}
CO2 SERPL-SCNC: 27 MMOL/L (ref 21–32)
CREAT SERPL-MCNC: 0.78 MG/DL (ref 0.51–0.95)
DEPRECATED RDW RBC: 39.9 FL (ref 39–50)
EGFRCR SERPLBLD CKD-EPI 2021: >90 ML/MIN/{1.73_M2}
EOSINOPHIL # BLD: 0.1 K/MCL (ref 0–0.5)
EOSINOPHIL NFR BLD: 1 %
ERYTHROCYTE [DISTWIDTH] IN BLOOD: 12.9 % (ref 11–15)
FASTING DURATION TIME PATIENT: 12 HOURS (ref 0–999)
GLOBULIN SER-MCNC: 3.3 G/DL (ref 2–4)
GLUCOSE SERPL-MCNC: 89 MG/DL (ref 70–99)
HBA1C MFR BLD: 5.2 % (ref 4.5–5.6)
HCT VFR BLD CALC: 41.4 % (ref 36–46.5)
HDLC SERPL-MCNC: 63 MG/DL
HGB BLD-MCNC: 13.6 G/DL (ref 12–15.5)
HIV 1+2 AB+HIV1 P24 AG SERPL QL IA: NONREACTIVE
IMM GRANULOCYTES # BLD AUTO: 0 K/MCL (ref 0–0.2)
IMM GRANULOCYTES # BLD: 0 %
LDLC SERPL CALC-MCNC: 54 MG/DL
LYMPHOCYTES # BLD: 2 K/MCL (ref 1–4.8)
LYMPHOCYTES NFR BLD: 32 %
MCH RBC QN AUTO: 28.2 PG (ref 26–34)
MCHC RBC AUTO-ENTMCNC: 32.9 G/DL (ref 32–36.5)
MCV RBC AUTO: 85.7 FL (ref 78–100)
MONOCYTES # BLD: 0.5 K/MCL (ref 0.3–0.9)
MONOCYTES NFR BLD: 9 %
NEUTROPHILS # BLD: 3.5 K/MCL (ref 1.8–7.7)
NEUTROPHILS NFR BLD: 57 %
NONHDLC SERPL-MCNC: 61 MG/DL
NRBC BLD MANUAL-RTO: 0 /100 WBC
PLATELET # BLD AUTO: 228 K/MCL (ref 140–450)
POTASSIUM SERPL-SCNC: 4 MMOL/L (ref 3.4–5.1)
PROT SERPL-MCNC: 7.1 G/DL (ref 6.4–8.2)
RBC # BLD: 4.83 MIL/MCL (ref 4–5.2)
SODIUM SERPL-SCNC: 137 MMOL/L (ref 135–145)
TRIGL SERPL-MCNC: 34 MG/DL
TSH SERPL-ACNC: 0.85 MCUNITS/ML (ref 0.35–5)
WBC # BLD: 6.2 K/MCL (ref 4.2–11)

## 2025-04-10 PROCEDURE — 87389 HIV-1 AG W/HIV-1&-2 AB AG IA: CPT | Performed by: CLINICAL MEDICAL LABORATORY

## 2025-04-10 PROCEDURE — 80061 LIPID PANEL: CPT | Performed by: CLINICAL MEDICAL LABORATORY

## 2025-04-10 PROCEDURE — 36415 COLL VENOUS BLD VENIPUNCTURE: CPT | Performed by: INTERNAL MEDICINE

## 2025-04-10 PROCEDURE — 83036 HEMOGLOBIN GLYCOSYLATED A1C: CPT | Performed by: CLINICAL MEDICAL LABORATORY

## 2025-04-10 PROCEDURE — 87591 N.GONORRHOEAE DNA AMP PROB: CPT | Performed by: CLINICAL MEDICAL LABORATORY

## 2025-04-10 PROCEDURE — 82306 VITAMIN D 25 HYDROXY: CPT | Performed by: CLINICAL MEDICAL LABORATORY

## 2025-04-10 PROCEDURE — 87491 CHLMYD TRACH DNA AMP PROBE: CPT | Performed by: CLINICAL MEDICAL LABORATORY

## 2025-04-10 PROCEDURE — 86592 SYPHILIS TEST NON-TREP QUAL: CPT | Performed by: CLINICAL MEDICAL LABORATORY

## 2025-04-10 PROCEDURE — 80050 GENERAL HEALTH PANEL: CPT | Performed by: CLINICAL MEDICAL LABORATORY

## 2025-04-10 RX ORDER — NORGESTIMATE AND ETHINYL ESTRADIOL 0.25-0.035
1 KIT ORAL DAILY
Qty: 28 TABLET | Refills: 11 | Status: SHIPPED | OUTPATIENT
Start: 2025-04-10 | End: 2025-04-10 | Stop reason: ALTCHOICE

## 2025-04-10 RX ORDER — NORETHINDRONE ACETATE AND ETHINYL ESTRADIOL 1.5-30(21)
1 KIT ORAL DAILY
Qty: 28 TABLET | Refills: 1 | Status: SHIPPED | OUTPATIENT
Start: 2025-04-10

## 2025-04-10 ASSESSMENT — PATIENT HEALTH QUESTIONNAIRE - PHQ9
CLINICAL INTERPRETATION OF PHQ2 SCORE: NO FURTHER SCREENING NEEDED
SUM OF ALL RESPONSES TO PHQ9 QUESTIONS 1 AND 2: 0
1. LITTLE INTEREST OR PLEASURE IN DOING THINGS: NOT AT ALL
2. FEELING DOWN, DEPRESSED OR HOPELESS: NOT AT ALL
SUM OF ALL RESPONSES TO PHQ9 QUESTIONS 1 AND 2: 0

## 2025-04-11 ENCOUNTER — TELEPHONE (OUTPATIENT)
Dept: FAMILY MEDICINE | Age: 25
End: 2025-04-11

## 2025-04-11 ENCOUNTER — E-ADVICE (OUTPATIENT)
Dept: INTERNAL MEDICINE | Age: 25
End: 2025-04-11

## 2025-04-11 LAB
C TRACH RRNA UR QL NAA+PROBE: NEGATIVE
N GONORRHOEA RRNA UR QL NAA+PROBE: NEGATIVE
RPR SER QL: NONREACTIVE
SERVICE CMNT-IMP: NORMAL

## 2025-05-16 DIAGNOSIS — Z30.09 BIRTH CONTROL COUNSELING: ICD-10-CM

## 2025-05-16 RX ORDER — NORETHINDRONE ACETATE AND ETHINYL ESTRADIOL 1.5-30(21)
1 KIT ORAL DAILY
Qty: 28 TABLET | Refills: 11 | Status: SHIPPED | OUTPATIENT
Start: 2025-05-16

## 2025-05-26 NOTE — OB PROVIDER H&P - NS_BABIESUTERO_OBGYN_ALL_OB_NU
[facility] Alert Outreach Telephone Call Attempt     Patient is being outreached by the Care Transitions Program for a clinical alert from the [employer] monitoring program.     Call Attempt Date: 5/25/2025    Call Attempt: First Attempt       1

## 2025-06-10 DIAGNOSIS — Z30.09 BIRTH CONTROL COUNSELING: ICD-10-CM

## 2025-06-10 RX ORDER — NORETHINDRONE ACETATE AND ETHINYL ESTRADIOL 1.5-30(21)
1 KIT ORAL DAILY
Qty: 28 TABLET | Refills: 11 | Status: SHIPPED | OUTPATIENT
Start: 2025-06-10

## 2025-06-19 ENCOUNTER — V-VISIT (OUTPATIENT)
Dept: FAMILY MEDICINE | Age: 25
End: 2025-06-19

## 2025-06-19 DIAGNOSIS — R30.0 DYSURIA: Primary | ICD-10-CM

## 2025-06-19 DIAGNOSIS — R82.90 ABNORMAL URINE ODOR: ICD-10-CM

## 2025-06-19 RX ORDER — NITROFURANTOIN 25; 75 MG/1; MG/1
100 CAPSULE ORAL 2 TIMES DAILY
Qty: 10 CAPSULE | Refills: 0 | Status: SHIPPED | OUTPATIENT
Start: 2025-06-19 | End: 2025-06-24

## 2025-06-24 ENCOUNTER — WALK IN (OUTPATIENT)
Dept: URGENT CARE | Age: 25
End: 2025-06-24

## 2025-06-24 ENCOUNTER — RESULTS FOLLOW-UP (OUTPATIENT)
Dept: URGENT CARE | Age: 25
End: 2025-06-24

## 2025-06-24 VITALS
HEART RATE: 80 BPM | TEMPERATURE: 99.9 F | OXYGEN SATURATION: 100 % | RESPIRATION RATE: 16 BRPM | SYSTOLIC BLOOD PRESSURE: 114 MMHG | DIASTOLIC BLOOD PRESSURE: 55 MMHG

## 2025-06-24 DIAGNOSIS — J02.0 ACUTE STREPTOCOCCAL PHARYNGITIS: Primary | ICD-10-CM

## 2025-06-24 DIAGNOSIS — J02.9 SORE THROAT: ICD-10-CM

## 2025-06-24 LAB
S PYO DNA THROAT QL NAA+PROBE: DETECTED
TEST LOT EXPIRATION DATE: ABNORMAL
TEST LOT NUMBER: ABNORMAL

## 2025-06-24 PROCEDURE — 87651 STREP A DNA AMP PROBE: CPT | Performed by: PHYSICIAN ASSISTANT

## 2025-06-24 PROCEDURE — 99213 OFFICE O/P EST LOW 20 MIN: CPT | Performed by: PHYSICIAN ASSISTANT

## 2025-06-24 RX ORDER — AMOXICILLIN 500 MG/1
500 CAPSULE ORAL 2 TIMES DAILY
Qty: 20 CAPSULE | Refills: 0 | Status: SHIPPED | OUTPATIENT
Start: 2025-06-24 | End: 2025-07-04

## 2025-06-24 ASSESSMENT — ENCOUNTER SYMPTOMS
SINUS PRESSURE: 0
HEADACHES: 1
DIAPHORESIS: 1
TROUBLE SWALLOWING: 0
RHINORRHEA: 0
SORE THROAT: 1
FATIGUE: 1
FEVER: 0
CHILLS: 1
RESPIRATORY NEGATIVE: 1
VOICE CHANGE: 0
SINUS PAIN: 0

## 2025-07-17 ENCOUNTER — OFFICE VISIT (OUTPATIENT)
Dept: INTERNAL MEDICINE | Age: 25
End: 2025-07-17

## 2025-07-17 VITALS
OXYGEN SATURATION: 98 % | TEMPERATURE: 97.6 F | WEIGHT: 152.9 LBS | HEIGHT: 66 IN | DIASTOLIC BLOOD PRESSURE: 60 MMHG | HEART RATE: 66 BPM | BODY MASS INDEX: 24.57 KG/M2 | SYSTOLIC BLOOD PRESSURE: 100 MMHG

## 2025-07-17 DIAGNOSIS — R30.0 DYSURIA: Primary | ICD-10-CM

## 2025-07-17 LAB
APPEARANCE, POC: ABNORMAL
BILIRUB UR QL STRIP: NEGATIVE
COLOR UR: YELLOW
GLUCOSE UR-MCNC: NEGATIVE MG/DL
HGB UR QL STRIP: NEGATIVE
KETONES UR STRIP-MCNC: NEGATIVE MG/DL
LEUKOCYTE ESTERASE UR QL STRIP: ABNORMAL
NITRITE UR QL STRIP: NEGATIVE
PH UR: 6.5 [PH] (ref 5–7)
PROT UR-MCNC: NEGATIVE MG/DL
SP GR UR: 1.02 (ref 1–1.03)
TEST LOT EXPIRATION DATE: ABNORMAL
TEST LOT NUMBER: ABNORMAL
UROBILINOGEN UR-MCNC: 1 MG/DL (ref 0–1)

## 2025-07-18 ENCOUNTER — TELEPHONE (OUTPATIENT)
Dept: INTERNAL MEDICINE | Age: 25
End: 2025-07-18

## 2025-07-18 DIAGNOSIS — R30.0 DYSURIA: Primary | ICD-10-CM

## 2025-07-19 PROBLEM — R30.0 DYSURIA: Status: ACTIVE | Noted: 2025-07-19

## 2025-07-28 ENCOUNTER — RESULTS FOLLOW-UP (OUTPATIENT)
Dept: INTERNAL MEDICINE | Age: 25
End: 2025-07-28

## 2025-07-28 ENCOUNTER — LAB SERVICES (OUTPATIENT)
Dept: LAB | Age: 25
End: 2025-07-28

## 2025-07-28 ENCOUNTER — E-ADVICE (OUTPATIENT)
Dept: INTERNAL MEDICINE | Age: 25
End: 2025-07-28

## 2025-07-28 DIAGNOSIS — N39.0 URINARY TRACT INFECTION WITH HEMATURIA, SITE UNSPECIFIED: Primary | ICD-10-CM

## 2025-07-28 DIAGNOSIS — N30.01 ACUTE CYSTITIS WITH HEMATURIA: Primary | ICD-10-CM

## 2025-07-28 DIAGNOSIS — R31.9 URINARY TRACT INFECTION WITH HEMATURIA, SITE UNSPECIFIED: Primary | ICD-10-CM

## 2025-07-28 DIAGNOSIS — R30.0 DYSURIA: ICD-10-CM

## 2025-07-28 LAB
APPEARANCE UR: ABNORMAL
BACTERIA #/AREA URNS HPF: ABNORMAL /HPF
BILIRUB UR QL STRIP: NEGATIVE
COLOR UR: ABNORMAL
GLUCOSE UR STRIP-MCNC: NEGATIVE MG/DL
HGB UR QL STRIP: ABNORMAL
HYALINE CASTS #/AREA URNS LPF: ABNORMAL /LPF
KETONES UR STRIP-MCNC: NEGATIVE MG/DL
LEUKOCYTE ESTERASE UR QL STRIP: ABNORMAL
MUCOUS THREADS URNS QL MICRO: PRESENT
NITRITE UR QL STRIP: POSITIVE
PH UR STRIP: 6.5 [PH] (ref 5–7)
PROT UR STRIP-MCNC: 30 MG/DL
RBC #/AREA URNS HPF: ABNORMAL /HPF
SP GR UR STRIP: 1.02 (ref 1–1.03)
SQUAMOUS #/AREA URNS HPF: ABNORMAL /HPF
UROBILINOGEN UR STRIP-MCNC: 0.2 MG/DL
WBC #/AREA URNS HPF: >100 /HPF

## 2025-07-28 PROCEDURE — 81001 URINALYSIS AUTO W/SCOPE: CPT | Performed by: CLINICAL MEDICAL LABORATORY

## 2025-07-28 PROCEDURE — 87077 CULTURE AEROBIC IDENTIFY: CPT | Performed by: CLINICAL MEDICAL LABORATORY

## 2025-07-28 PROCEDURE — 87086 URINE CULTURE/COLONY COUNT: CPT | Performed by: CLINICAL MEDICAL LABORATORY

## 2025-07-28 PROCEDURE — 87186 SC STD MICRODIL/AGAR DIL: CPT | Performed by: CLINICAL MEDICAL LABORATORY

## 2025-07-28 RX ORDER — SULFAMETHOXAZOLE AND TRIMETHOPRIM 800; 160 MG/1; MG/1
1 TABLET ORAL 2 TIMES DAILY
Qty: 6 TABLET | Refills: 0 | Status: SHIPPED | OUTPATIENT
Start: 2025-07-28 | End: 2025-07-31

## 2025-07-28 RX ORDER — NITROFURANTOIN 25; 75 MG/1; MG/1
100 CAPSULE ORAL 2 TIMES DAILY
Qty: 10 CAPSULE | Refills: 0 | Status: SHIPPED | OUTPATIENT
Start: 2025-07-28 | End: 2025-07-28 | Stop reason: ALTCHOICE

## 2025-07-30 LAB — BACTERIA UR CULT: ABNORMAL

## 2025-07-30 RX ORDER — CIPROFLOXACIN 250 MG/1
250 TABLET, FILM COATED ORAL 2 TIMES DAILY
Qty: 6 TABLET | Refills: 0 | Status: SHIPPED | OUTPATIENT
Start: 2025-07-30 | End: 2025-08-02

## 2025-08-28 ENCOUNTER — LAB SERVICES (OUTPATIENT)
Dept: LAB | Age: 25
End: 2025-08-28

## 2025-10-13 ENCOUNTER — APPOINTMENT (OUTPATIENT)
Dept: OBGYN | Age: 25
End: 2025-10-13